# Patient Record
Sex: FEMALE | Race: WHITE | URBAN - METROPOLITAN AREA
[De-identification: names, ages, dates, MRNs, and addresses within clinical notes are randomized per-mention and may not be internally consistent; named-entity substitution may affect disease eponyms.]

---

## 2017-03-07 ENCOUNTER — OFFICE VISIT (OUTPATIENT)
Dept: ORTHOPEDICS | Facility: CLINIC | Age: 42
End: 2017-03-07

## 2017-03-07 VITALS — HEIGHT: 67 IN | BODY MASS INDEX: 45.69 KG/M2 | WEIGHT: 291.1 LBS

## 2017-03-07 DIAGNOSIS — Z98.890 S/P KNEE SURGERY: ICD-10-CM

## 2017-03-07 DIAGNOSIS — G89.29 CHRONIC PAIN OF LEFT KNEE: Primary | ICD-10-CM

## 2017-03-07 DIAGNOSIS — M25.562 CHRONIC PAIN OF LEFT KNEE: Primary | ICD-10-CM

## 2017-03-07 RX ORDER — CELECOXIB 100 MG/1
100 CAPSULE ORAL 2 TIMES DAILY
Qty: 60 CAPSULE | Refills: 0 | Status: SHIPPED | OUTPATIENT
Start: 2017-03-07

## 2017-03-07 NOTE — LETTER
3/7/2017       RE: Maureen Arndt  [address] [place] [facility] [address]     Dear Colleague,    Thank you for referring your patient, Maureen Arndt, to the [facility] at [facility]. Please see a copy of my visit note below.    HISTORY OF PRESENT ILLNESS:  Patient is a 41-year-old female who is now just approximately 14 months status post a lateral femoral condyle debridement, partial lateral facetectomy and LRL.      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  At the time of her surgery in 01/2016, we envisioned that we might do a PFA patellofemoral arthroplasty but felt that her lateral compartment was too diseased to do that in isolation.      When talking about potential next steps, they all are not favorable in light of her continued obesity.  I believe that she is not a great candidate for an osteotomy/osteochondral allograft.  However, I was rather surprised that she at least initially did not get any relief from the surgical procedure that we did.      She now is on a weight loss program under the care of a dietitian.  She got a free health club membership and she just started this 1 week ago.  She goes daily and alternates between walking on the track and swimming in the pool.  She does the NuStep and some quad strengthening.      She still has trouble with steps at home.  She has fallen more than once and she is under the care of a chiropractor for an alleged fracture of her coccyx due to this fall at home.      By our scales today she remains nearly identical in her weight of 291 which computes to a BMI of 45.6.  However, at home she weighs 284 by her weight loss counselor.      She continues to report burning pain.  She is on 900 of gabapentin per day.  We did talk about the possibility of increasing this, but that also has a potential for water weight gain and therefore, we opted to potentially give her Celebrex.  She wants to stay  "away from nonsteroidals due to her sleeve operation which was done 2 years ago.      Other interventions that we tried, injections helps for days only.  We tried a lateral  brace which was \"worthless\".      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  She oftentimes works for 3 clients in the same house, but this can be variable.  She has not worked since her surgery in 01/2016.  She is on long-term disability and feels that she still is not safe even by herself on stairs and does not feel comfortable caring for an older disabled adult.      At this point in time, I feel that she is starting to do what we talked about and that is to get fitter and to have some weight loss.  She seems to be in better spirits talking with her today.  She seems to be encouraged about her current exercise regimen.      I have just encourage her to stay the course and the only addition that we have made is to add Celebrex and she can take this 100 mg b.i.d. as needed for pain.      Lastly, I have strongly encouraged her to use pool therapy generously as I think that she will get farther with calorie burning without aggravating her knee.  She is now doing that again on an every other day basis, although this is just now in its second week.      She will follow up with me as needed.   was present for this visit.      Room time 25 minutes, consultation time 20.     Again, thank you for allowing me to participate in the care of your patient.      Sincerely,    Akanksha Han MD       cc:   Rosie Hernandez MD     "

## 2017-03-07 NOTE — MR AVS SNAPSHOT
"              After Visit Summary   3/7/2017    Maureen Arndt    MRN: [id number]           Patient Information     Date Of Birth          1975        Visit Information        Provider Department      3/7/2017 1:30 PM Akanksha Han MD [facility]        Today's Diagnoses     Chronic pain of left knee    -  1    S/P knee surgery           Follow-ups after your visit        Who to contact     Please call your clinic at [phone] to:    Ask questions about your health    Make or cancel appointments    Discuss your medicines    Learn about your test results    Speak to your doctor   If you have compliments or concerns about an experience at your clinic, or if you wish to file a complaint, please contact [facility] Patient Relations at [phone] or email us at [email or web address]         Additional Information About Your Visit        MyChart Information     [employer] gives you secure access to your electronic health record. If you see a primary care provider, you can also send messages to your care team and make appointments. If you have questions, please call your primary care clinic.  If you do not have a primary care provider, please call [phone] and they will assist you.      [employer] is an electronic gateway that provides easy, online access to your medical records. With [employer], you can request a clinic appointment, read your test results, renew a prescription or communicate with your care team.     To access your existing account, please contact your [facility] or call [phone] for assistance.        Care EveryWhere ID     This is your Care EveryWhere ID. This could be used by other organizations to access your [facility] medical records  [id number]        Your Vitals Were     Height BMI (Body Mass Index)                1.702 m (5' 7\") 45.59 kg/m2           Blood Pressure from Last 3 Encounters:   No " data found for BP    Weight from Last 3 Encounters:   No data found for Wt              Today, you had the following     No orders found for display         Today's Medication Changes          These changes are accurate as of: 3/7/17 11:59 PM.  If you have any questions, ask your nurse or doctor.               Start taking these medicines.        Dose/Directions    celecoxib 100 MG capsule   Commonly known as:  celeBREX   Used for:  Chronic pain of left knee        Dose:  100 mg   Take 1 capsule (100 mg) by mouth 2 times daily   Quantity:  60 capsule   Refills:  0            Where to get your medicines      These medications were sent to [facility] - [place], [place] - [address], [place]  [address], [place], [place] [address]     Phone:  [phone]     celecoxib 100 MG capsule                Primary Care Provider    No Pcp Confirmed       No address on file        Thank you!     Thank you for choosing [facility]  for your care. Our goal is always to provide you with excellent care. Hearing back from our patients is one way we can continue to improve our services. Please take a few minutes to complete the written survey that you may receive in the mail after your visit with us. Thank you!             Your Updated Medication List - Protect others around you: Learn how to safely use, store and throw away your medicines at www.disposemymeds.org.          This list is accurate as of: 3/7/17 11:59 PM.  Always use your most recent med list.                   Brand Name Dispense Instructions for use    celecoxib 100 MG capsule    celeBREX    60 capsule    Take 1 capsule (100 mg) by mouth 2 times daily       FLINTSTONES COMPLETE PO      Take 1 tablet by mouth daily       gabapentin 300 MG capsule    NEURONTIN    120 capsule    Take 1 capsule (300 mg) by mouth 3 times daily Take 1 tab in the morning, 1 tab in the afternoon and 2 tabs at bedtime       order for DME     1 Units    Cane        SERTRALINE HCL PO      Take 150 mg by mouth daily       Vitamin B-12 500 MCG Lozg      Take 1 tablet by mouth daily       VITAMIN D3 PO      Take 5,000 Units by mouth daily       VITRON-C PO

## 2017-03-07 NOTE — NURSING NOTE
"Reason For Visit:   Chief Complaint   Patient presents with     RECHECK     follow-up on her Left Knee       Primary: Dr. Rosie Hernandez  Ref. MD: Dr. Kirkpatrick     Occupation works with disabled adults.  Currently working? No.  Work status?  On medical leave.  Date of injury: none     Date of surgery: 1/8/16  Type of surgery: OPERATIONS:   1. Left knee exam under anesthesia.   2. Diagnostic arthroscopy.   3. Lateral femoral condyle debridement.   4. Partial lateral facetectomy (15 mm).   5. Lateral retinacular lengthening (25 mm). .  Smoker: No      Pain Assessment  Patient Currently in Pain: Yes  Patient's Stated Pain Goal: No pain  0-10 Pain Scale: 6  Primary Pain Location: Knee  Pain Orientation: Left  Pain Descriptors: Burning, Aching  Alleviating Factors: Ice  Aggravating Factors: Walking, Stairs    Ht 1.702 m (5' 7\")  Wt 132 kg (291 lb 1.6 oz)  BMI 45.59 kg/m2    aller  Current Outpatient Prescriptions   Medication     Iron-Vitamin C (VITRON-C PO)     gabapentin (NEURONTIN) 300 MG capsule     order for DME     Cholecalciferol (VITAMIN D3 PO)     Cyanocobalamin (VITAMIN B-12) 500 MCG [facility]     Pediatric Multivit-Minerals-C (FLINTSTONES COMPLETE PO)     SERTRALINE HCL PO     No current facility-administered medications for this visit.      Facility-Administered Medications Ordered in Other Visits   Medication     bupivacaine 0.5 % -  EPINEPHrine 1:200,000 injection                                                      "

## 2017-03-07 NOTE — PROGRESS NOTES
"HISTORY OF PRESENT ILLNESS:  Patient is a 41-year-old female who is now just approximately 14 months status post a lateral femoral condyle debridement, partial lateral facetectomy and LRL.      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  At the time of her surgery in 01/2016, we envisioned that we might do a PFA patellofemoral arthroplasty but felt that her lateral compartment was too diseased to do that in isolation.      When talking about potential next steps, they all are not favorable in light of her continued obesity.  I believe that she is not a great candidate for an osteotomy/osteochondral allograft.  However, I was rather surprised that she at least initially did not get any relief from the surgical procedure that we did.      She now is on a weight loss program under the care of a dietitian.  She got a free health club membership and she just started this 1 week ago.  She goes daily and alternates between walking on the track and swimming in the pool.  She does the NuStep and some quad strengthening.      She still has trouble with steps at home.  She has fallen more than once and she is under the care of a chiropractor for an alleged fracture of her coccyx due to this fall at home.      By our scales today she remains nearly identical in her weight of 291 which computes to a BMI of 45.6.  However, at home she weighs 284 by her weight loss counselor.      She continues to report burning pain.  She is on 900 of gabapentin per day.  We did talk about the possibility of increasing this, but that also has a potential for water weight gain and therefore, we opted to potentially give her Celebrex.  She wants to stay away from nonsteroidals due to her sleeve operation which was done 2 years ago.      Other interventions that we tried, injections helps for days only.  We tried a lateral  brace which was \"worthless\".      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  She oftentimes works for 3 clients in the same house, but this can be variable.  She has not worked since her surgery in 01/2016.  She is on long-term disability and feels that she still is not safe even by herself on stairs and does not feel comfortable caring for an older disabled adult.      At this point in time, I feel that she is starting to do what we talked about and that is to get fitter and to have some weight loss.  She seems to be in better spirits talking with her today.  She seems to be encouraged about her current exercise regimen.      I have just encourage her to stay the course and the only addition that we have made is to add Celebrex and she can take this 100 mg b.i.d. as needed for pain.      Lastly, I have strongly encouraged her to use pool therapy generously as I think that she will get farther with calorie burning without aggravating her knee.  She is now doing that again on an every other day basis, although this is just now in its second week.      She will follow up with me as needed.   was present for this visit.      Room time 25 minutes, consultation time 20.      cc:   Rosie Hernandez MD       Answers for HPI/ROS submitted by the patient on 3/7/2017   General Symptoms: No  Skin Symptoms: No  HENT Symptoms: No  EYE SYMPTOMS: No  HEART SYMPTOMS: No  LUNG SYMPTOMS: No  INTESTINAL SYMPTOMS: No  URINARY SYMPTOMS: No  GYNECOLOGIC SYMPTOMS: No  BREAST SYMPTOMS: No  SKELETAL SYMPTOMS: No  BLOOD SYMPTOMS: No  NERVOUS SYSTEM SYMPTOMS: No  MENTAL HEALTH SYMPTOMS: No

## 2017-12-24 ENCOUNTER — HEALTH MAINTENANCE LETTER (OUTPATIENT)
Age: 42
End: 2017-12-24

## 2020-03-10 ENCOUNTER — HEALTH MAINTENANCE LETTER (OUTPATIENT)
Age: 45
End: 2020-03-10

## 2020-12-27 ENCOUNTER — HEALTH MAINTENANCE LETTER (OUTPATIENT)
Age: 45
End: 2020-12-27

## 2021-04-24 ENCOUNTER — HEALTH MAINTENANCE LETTER (OUTPATIENT)
Age: 46
End: 2021-04-24

## 2021-10-03 ENCOUNTER — HEALTH MAINTENANCE LETTER (OUTPATIENT)
Age: 46
End: 2021-10-03

## 2022-05-15 ENCOUNTER — HEALTH MAINTENANCE LETTER (OUTPATIENT)
Age: 47
End: 2022-05-15

## 2022-07-10 ENCOUNTER — HEALTH MAINTENANCE LETTER (OUTPATIENT)
Age: 47
End: 2022-07-10

## 2022-09-11 ENCOUNTER — HEALTH MAINTENANCE LETTER (OUTPATIENT)
Age: 47
End: 2022-09-11

## 2023-06-03 ENCOUNTER — HEALTH MAINTENANCE LETTER (OUTPATIENT)
Age: 48
End: 2023-06-03

## 2023-07-29 ENCOUNTER — HEALTH MAINTENANCE LETTER (OUTPATIENT)
Age: 48
End: 2023-07-29